# Patient Record
Sex: MALE | Race: WHITE | NOT HISPANIC OR LATINO | URBAN - NONMETROPOLITAN AREA
[De-identification: names, ages, dates, MRNs, and addresses within clinical notes are randomized per-mention and may not be internally consistent; named-entity substitution may affect disease eponyms.]

---

## 2019-03-11 ENCOUNTER — IMPORTED ENCOUNTER (OUTPATIENT)
Dept: URBAN - NONMETROPOLITAN AREA CLINIC 1 | Facility: CLINIC | Age: 64
End: 2019-03-11

## 2019-03-11 PROBLEM — H52.4: Noted: 2019-03-11

## 2019-03-11 PROBLEM — Z79.02: Noted: 2019-03-11

## 2019-03-11 PROCEDURE — 92012 INTRM OPH EXAM EST PATIENT: CPT

## 2019-03-11 PROCEDURE — 92015 DETERMINE REFRACTIVE STATE: CPT

## 2019-03-11 NOTE — PATIENT DISCUSSION
ENRIQUE OU- Discussed diagnosis in detail with patient- Discussed signs and symptoms of progression- Recommend patient drinking plenty of water and starting Omega 3’s - Recommend Refresh or Systane  throughout the day- Consider Restasis or plugs in the future if no improvement- Continue to Heena OU- Discussed diagnosis in detail with patient- Discussed signs and symptoms of progression- Discussed UV protection- No treatment needed at this time - Continue to monitorHyperopia / Presbyopia OU - Discussed diagnosis in detail with patient - New glasses Rx given today- Continue to monitorLong term medication(Plavix)- Discussed diagnosis in detail with patient- No ocular indications concerning problems found today- Monitor

## 2019-04-08 ENCOUNTER — IMPORTED ENCOUNTER (OUTPATIENT)
Dept: URBAN - NONMETROPOLITAN AREA CLINIC 1 | Facility: CLINIC | Age: 64
End: 2019-04-08

## 2019-04-08 PROBLEM — H16.223: Noted: 2019-04-08

## 2019-04-08 PROBLEM — Z79.02: Noted: 2019-03-11

## 2019-04-08 PROBLEM — H25.13: Noted: 2019-04-08

## 2019-04-08 PROBLEM — H52.4: Noted: 2019-04-08

## 2019-04-08 NOTE — PATIENT DISCUSSION
Hyperopia / Presbyopia OU - Discussed diagnosis in detail with patient - MR merida today by me and new Rx was given will have opitcal remake glasses for patient - Continue to monitor----------------------Notes below are from last visit-------------------------ENRIQUE OU- Discussed diagnosis in detail with patient- Discussed signs and symptoms of progression- Recommend patient drinking plenty of water and starting Omega 3’s - Recommend Refresh or Systane  throughout the day- Consider Restasis or plugs in the future if no improvement- Continue to Heena OU- Discussed diagnosis in detail with patient- Discussed signs and symptoms of progression- Discussed UV protection- No treatment needed at this time - Continue to monitorLong term medication(Plavix)- Discussed diagnosis in detail with patient- No ocular indications concerning problems found today- Monitor

## 2019-11-18 ENCOUNTER — IMPORTED ENCOUNTER (OUTPATIENT)
Dept: URBAN - NONMETROPOLITAN AREA CLINIC 1 | Facility: CLINIC | Age: 64
End: 2019-11-18

## 2019-11-18 PROBLEM — H25.13: Noted: 2019-11-18

## 2019-11-18 PROBLEM — Z79.02: Noted: 2019-11-18

## 2019-11-18 PROBLEM — H16.223: Noted: 2019-11-18

## 2019-11-18 PROBLEM — H52.4: Noted: 2019-11-18

## 2019-11-18 PROBLEM — H00.024: Noted: 2019-11-18

## 2019-11-18 PROCEDURE — 99213 OFFICE O/P EST LOW 20 MIN: CPT

## 2019-11-18 NOTE — PATIENT DISCUSSION
Karen FONTANA- Discussed diagnosis in detail with patient - Recommend HOT compresses 10-15 mins on and 45 mins off- Start clindamycin TID x 7 days Rx sent  to [address] [facility] baby shampoo to scrub lids daily - Consider lid consult with Dr. Catia Ricardo if no improvement- Continue to monitor --------------------------------previous notes-----------------------Hyperopia / Presbyopia OU - Discussed diagnosis in detail with patient - MR merida today by me and new Rx was given will have opitcal remake glasses for patient - Continue to monitorDES OU- Discussed diagnosis in detail with patient- Discussed signs and symptoms of progression- Recommend patient drinking plenty of water and starting Omega 3’s - Recommend Refresh or Systane  throughout the day- Consider Restasis or plugs in the future if no improvement- Continue to Heena SCHROEDER- Discussed diagnosis in detail with patient- Discussed signs and symptoms of progression- Discussed UV protection- No treatment needed at this time - Continue to monitorLong term medication(Plavix)- Discussed diagnosis in detail with patient- No ocular indications concerning problems found today- Monitor

## 2019-12-09 ENCOUNTER — IMPORTED ENCOUNTER (OUTPATIENT)
Dept: URBAN - NONMETROPOLITAN AREA CLINIC 1 | Facility: CLINIC | Age: 64
End: 2019-12-09

## 2019-12-09 PROBLEM — H00.14: Noted: 2019-12-09

## 2019-12-09 PROBLEM — H00.024: Noted: 2019-12-09

## 2019-12-09 PROBLEM — Z79.02: Noted: 2019-12-09

## 2019-12-09 PROBLEM — H16.223: Noted: 2019-12-09

## 2019-12-09 PROBLEM — H25.13: Noted: 2019-12-09

## 2019-12-09 PROBLEM — H52.4: Noted: 2019-12-09

## 2019-12-09 PROCEDURE — 99213 OFFICE O/P EST LOW 20 MIN: CPT

## 2019-12-09 NOTE — PATIENT DISCUSSION
Karen FONTANA / Chaloseas- Discussed diagnosis in detail with patient - Recommend HOT compresses 10-15 mins on and 45 mins off- D/C clindamycin- Recommend [employer] baby shampoo to scrub lids daily - Recommend  consult with Dr. Rosangela Arellano patient agrees with plan- Continue to monitor --------------------------------previous notes-----------------------Hyperopia / Presbyopia OU - Discussed diagnosis in detail with patient - MR merida today by me and new Rx was given will have opitcal remake glasses for patient - Continue to monitorDES OU- Discussed diagnosis in detail with patient- Discussed signs and symptoms of progression- Recommend patient drinking plenty of water and starting Omega 3’s - Recommend Refresh or Systane  throughout the day- Consider Restasis or plugs in the future if no improvement- Continue to Heena SCHROEDER- Discussed diagnosis in detail with patient- Discussed signs and symptoms of progression- Discussed UV protection- No treatment needed at this time - Continue to monitorLong term medication(Plavix)- Discussed diagnosis in detail with patient- No ocular indications concerning problems found today- Monitor

## 2020-02-12 ENCOUNTER — IMPORTED ENCOUNTER (OUTPATIENT)
Dept: URBAN - NONMETROPOLITAN AREA CLINIC 1 | Facility: CLINIC | Age: 65
End: 2020-02-12

## 2020-02-12 PROCEDURE — 92012 INTRM OPH EXAM EST PATIENT: CPT

## 2020-02-12 NOTE — PATIENT DISCUSSION
"Harvey VALVERDEL - Discussed diagnosis in detail with patient - Discussed with patient that at this point I recc surgical removal and patient agrees. -All RBA""s of procedure were gone over in detail with patient. Celena Valverde to call patient to schedule [phone].  Task sent. ------------------------------previous notes-----------------------Hyperopia / Presbyopia OU - Discussed diagnosis in detail with patient -  redone today by me and new Rx was given will have opitcal remake glasses for patient - Continue to monitorDES OU- Discussed diagnosis in detail with patient- Discussed signs and symptoms of progression- Recommend patient drinking plenty of water and starting Omega 3’s - Recommend Refresh or Systane  throughout the day- Consider Restasis or plugs in the future if no improvement- Continue to Heena SCHROEDER- Discussed diagnosis in detail with patient- Discussed signs and symptoms of progression- Discussed UV protection- No treatment needed at this time - Continue to monitorLong term medication(Plavix)- Discussed diagnosis in detail with patient- No ocular indications concerning problems found today- Monitor"

## 2020-02-14 PROBLEM — H25.13: Noted: 2020-02-14

## 2020-02-14 PROBLEM — H16.223: Noted: 2020-02-14

## 2020-02-14 PROBLEM — H52.4: Noted: 2020-02-14

## 2020-02-14 PROBLEM — H00.14: Noted: 2020-02-14

## 2020-02-14 PROBLEM — Z79.02: Noted: 2020-02-14

## 2020-03-08 ENCOUNTER — IMPORTED ENCOUNTER (OUTPATIENT)
Dept: URBAN - NONMETROPOLITAN AREA CLINIC 1 | Facility: CLINIC | Age: 65
End: 2020-03-08

## 2020-03-08 PROCEDURE — 67800 REMOVE EYELID LESION: CPT

## 2020-03-08 NOTE — PATIENT DISCUSSION
Chalazion ADDI excision done today w/o any complications after all ABN and consents signedPatient to return to office in one week for a f/uerythromycin ointment sent for patient to use BID x 1 week.  Patient schedule thursday for a complete eye exam. told patient to keep apt time and use this as his f/u and then come back two weeks after that for his complete eye exam. ------------------------------previous notes-----------------------Hyperopia / Presbyopia OU - Discussed diagnosis in detail with patient - MR merida today by me and new Rx was given will have opitcal remake glasses for patient - Continue to monitorDES OU- Discussed diagnosis in detail with patient- Discussed signs and symptoms of progression- Recommend patient drinking plenty of water and starting Omega 3’s - Recommend Refresh or Systane  throughout the day- Consider Restasis or plugs in the future if no improvement- Continue to Heena OU- Discussed diagnosis in detail with patient- Discussed signs and symptoms of progression- Discussed UV protection- No treatment needed at this time - Continue to monitorLong term medication(Plavix)- Discussed diagnosis in detail with patient- No ocular indications concerning problems found today- Monitor

## 2020-03-08 NOTE — PATIENT DISCUSSION
"Harvey VALVERDEL - Discussed diagnosis in detail with patient - Discussed with patient that at this point I recc surgical removal and patient agrees. -All RBA""s of procedure were gone over in detail with patient. Arpita Ruiz to call patient to schedule [phone].  Task sent. ------------------------------previous notes-----------------------Hyperopia / Presbyopia OU - Discussed diagnosis in detail with patient - MR merida today by me and new Rx was given will have opitcal remake glasses for patient - Continue to monitorDES OU- Discussed diagnosis in detail with patient- Discussed signs and symptoms of progression- Recommend patient drinking plenty of water and starting Omega 3’s - Recommend Refresh or Systane  throughout the day- Consider Restasis or plugs in the future if no improvement- Continue to Heena SCHROEDER- Discussed diagnosis in detail with patient- Discussed signs and symptoms of progression- Discussed UV protection- No treatment needed at this time - Continue to monitorLong term medication(Plavix)- Discussed diagnosis in detail with patient- No ocular indications concerning problems found today- Monitor"

## 2020-03-12 ENCOUNTER — IMPORTED ENCOUNTER (OUTPATIENT)
Dept: URBAN - NONMETROPOLITAN AREA CLINIC 1 | Facility: CLINIC | Age: 65
End: 2020-03-12

## 2020-03-12 PROBLEM — Z98.890: Noted: 2020-03-12

## 2020-03-12 PROBLEM — Z79.02: Noted: 2020-02-14

## 2020-03-12 PROBLEM — H52.4: Noted: 2020-02-14

## 2020-03-12 PROBLEM — H16.223: Noted: 2020-02-14

## 2020-03-12 PROBLEM — H25.13: Noted: 2020-02-14

## 2020-03-12 PROCEDURE — 99024 POSTOP FOLLOW-UP VISIT: CPT

## 2020-03-12 NOTE — PATIENT DISCUSSION
4 Day POV Chalazion Removal ADDI- Discussed diagnosis in detail with patient - Patient stable and doing well - May D/C ointment - Continue to monitor - RTC A/S ------------------------------previous notes-----------------------Hyperopia / Presbyopia OU - Discussed diagnosis in detail with patient -  redone today by me and new Rx was given will have opitcal remake glasses for patient - Continue to monitorDES OU- Discussed diagnosis in detail with patient- Discussed signs and symptoms of progression- Recommend patient drinking plenty of water and starting Omega 3’s - Recommend Refresh or Systane  throughout the day- Consider Restasis or plugs in the future if no improvement- Continue to Heena OU- Discussed diagnosis in detail with patient- Discussed signs and symptoms of progression- Discussed UV protection- No treatment needed at this time - Continue to monitorLong term medication(Plavix)- Discussed diagnosis in detail with patient- No ocular indications concerning problems found today- Monitor

## 2020-05-13 ENCOUNTER — IMPORTED ENCOUNTER (OUTPATIENT)
Dept: URBAN - NONMETROPOLITAN AREA CLINIC 1 | Facility: CLINIC | Age: 65
End: 2020-05-13

## 2020-05-13 PROBLEM — R51: Noted: 2020-11-20

## 2020-05-13 PROBLEM — Z79.02: Noted: 2020-05-13

## 2020-05-13 PROBLEM — H16.223: Noted: 2020-05-13

## 2020-05-13 PROBLEM — H25.13: Noted: 2020-05-13

## 2020-05-13 PROBLEM — H53.8: Noted: 2020-11-20

## 2020-05-13 PROBLEM — Z98.890: Noted: 2020-05-13

## 2020-05-13 PROBLEM — R51: Noted: 2020-05-13

## 2020-05-13 PROBLEM — H52.4: Noted: 2020-05-13

## 2020-05-13 PROCEDURE — 99213 OFFICE O/P EST LOW 20 MIN: CPT

## 2020-05-13 NOTE — PATIENT DISCUSSION
Intermittent headaches with fluctuating vision - Discussed diagnosis in detail with patient - VA 20/20 today and pressures are OD 10 and OS 11- Patient was put on Prednisone 20mg BID by GP - GP wanted patient to have ophthalmic exam because of complaints of headaches. GP ordered blood work done results were: SED rate 53 and CRP was 19.6.  Pending results of ultrasound for temporal artery- (-) Signs of Ischemic Optic neuropathy secondary to GCA (giant cell arteritis)- Optic nerve and vision appear normal today- Will communicate with GP and follow up with patient in 1 month - Continue to monitor - RTC 1 month F/U with VF 24-2 and OCT ------------------------------previous notes-----------------------S/P  Chalazion Removal ADDI- Discussed diagnosis in detail with patient - Patient stable and doing well - May D/C ointment - Continue to monitor - RTC A/S Hyperopia / Presbyopia OU - Discussed diagnosis in detail with patient - MR merida today by me and new Rx was given will have optical remake glasses for patient - Continue to monitorDES OU- Discussed diagnosis in detail with patient- Discussed signs and symptoms of progression- Recommend patient drinking plenty of water and starting Omega 3’s - Recommend Refresh or Systane  throughout the day- Consider Restasis or plugs in the future if no improvement- Continue to [place] OU- Discussed diagnosis in detail with patient- Discussed signs and symptoms of progression- Discussed UV protection- No treatment needed at this time - Continue to monitorLong term medication(Plavix)- Discussed diagnosis in detail with patient- No ocular indications concerning problems found today- Monitor

## 2020-07-21 ENCOUNTER — IMPORTED ENCOUNTER (OUTPATIENT)
Dept: URBAN - NONMETROPOLITAN AREA CLINIC 1 | Facility: CLINIC | Age: 65
End: 2020-07-21

## 2020-07-21 PROBLEM — H16.223: Noted: 2020-05-13

## 2020-07-21 PROBLEM — H25.13: Noted: 2020-05-13

## 2020-07-21 PROBLEM — Z79.02: Noted: 2020-05-13

## 2020-07-21 PROBLEM — H52.4: Noted: 2020-05-13

## 2020-07-21 PROBLEM — R51: Noted: 2020-05-13

## 2020-07-21 PROBLEM — H53.8: Noted: 2020-07-21

## 2020-07-21 PROCEDURE — 92015 DETERMINE REFRACTIVE STATE: CPT

## 2020-07-21 PROCEDURE — 92014 COMPRE OPH EXAM EST PT 1/>: CPT

## 2020-07-21 PROCEDURE — 92083 EXTENDED VISUAL FIELD XM: CPT

## 2020-07-21 PROCEDURE — 92133 CPTRZD OPH DX IMG PST SGM ON: CPT

## 2020-07-21 NOTE — PATIENT DISCUSSION
Intermittent headaches with fluctuating vision - Discussed diagnosis in detail with patient - VA 20/22 OD and 20/20 OS today and pressures are OD 12 and OS 11 stable from last visit- Patient was put on Prednisone 20mg BID by GP - GP wanted patient to have ophthalmic exam because of complaints of headaches. GP ordered blood work done results were: SED rate 53 and CRP was 19.6. Pending results of ultrasound for temporal artery. Will need to discuss further testing results with Dr. Lani Regalado. - No signs of Ischemic Optic neuropathy secondary to GCA (giant cell arteritis)- Optic nerve and vision appear normal today- Baseline VF done today good and reliable without abnormalities seen.  - Baseline OCT done today good quality OD without any thinning noted and OS was poor quality but feel that he has no thinning based on posterior exam. - Will communicate with GP and follow up - Continue to monitor for changes PRNHyperopia / Presbyopia OU - Discussed diagnosis in detail with patient - MR done by me today new GLRx given and recommended update- Continue to monitor PRNDES OU- Discussed diagnosis in detail with patient- Discussed signs and symptoms of progression- Recommend patient drinking plenty of water and starting Omega 3’s - Recommend Refresh or Systane  throughout the day- Consider Restasis or plugs in the future if no improvement- Continue to monitorCataracts OU- Discussed diagnosis in detail with patient- Discussed signs and symptoms of progression including blurry vision- Discussed UV protection- No treatment needed at this time - Continue to monitorLong term medication(Plavix)- Discussed diagnosis in detail with patient- No ocular indications concerning problems found today- MonitorHx of Chalazion Removal ADDI- Discussed diagnosis in detail with patient - Patient stable and doing well - Continue to monitor recurrence PRN; Dr's Notes: MR  7/21/20DFE  OCT disc 7/21/20VF 24-2  7/21/20

## 2020-09-03 ENCOUNTER — IMPORTED ENCOUNTER (OUTPATIENT)
Dept: URBAN - NONMETROPOLITAN AREA CLINIC 1 | Facility: CLINIC | Age: 65
End: 2020-09-03

## 2020-09-03 NOTE — PATIENT DISCUSSION
RX check- Discusseddiagnosis in detail with patient - MR done today by Dr. Chad Mast will remake lenses- Patient is still on steroids and probably will be for a year - Continue to monitor - RTC A/S --------------------------------previous notes---------------------------Intermittent headaches with fluctuating vision - Discussed diagnosis in detail with patient - VA 20/22 OD and 20/20 OS today and pressures are OD 12 and OS 11 stable from last visit- Patient was put on Prednisone 20mg BID by GP - GP wanted patient to have ophthalmic exam because of complaints of headaches. GP ordered blood work done results were: SED rate 53 and CRP was 19.6. Pending results of ultrasound for temporal artery. Will need to discuss further testing results with Dr. Anette Bradley. - No signs of Ischemic Optic neuropathy secondary to GCA (giant cell arteritis)- Optic nerve and vision appear normal today- Baseline VF done today good and reliable without abnormalities seen.  - Baseline OCT done today good quality OD without any thinning noted and OS was poor quality but feel that he has no thinning based on posterior exam. - Will communicate with GP and follow up - Continue to monitor for changes PRNHyperopia / Presbyopia OU - Discussed diagnosis in detail with patient - MR done by me today new GLRx given and recommended update- Continue to monitor PRNDES OU- Discussed diagnosis in detail with patient- Discussed signs and symptoms of progression- Recommend patient drinking plenty of water and starting Omega 3’s - Recommend Refresh or Systane  throughout the day- Consider Restasis or plugs in the future if no improvement- Continue to monitorCataracts OU- Discussed diagnosis in detail with patient- Discussed signs and symptoms of progression including blurry vision- Discussed UV protection- No treatment needed at this time - Continue to monitorLong term medication(Plavix)- Discussed diagnosis in detail with patient- No ocular indications concerning problems found today- MonitorHx of Chalazion Removal ADDI- Discussed diagnosis in detail with patient - Patient stable and doing well - Continue to monitor recurrence PRN; Dr's Notes: MR  7/21/20DFE  OCT disc 7/21/20VF 24-2  7/21/20

## 2020-11-20 ENCOUNTER — IMPORTED ENCOUNTER (OUTPATIENT)
Dept: URBAN - NONMETROPOLITAN AREA CLINIC 1 | Facility: CLINIC | Age: 65
End: 2020-11-20

## 2020-11-20 PROCEDURE — 99212 OFFICE O/P EST SF 10 MIN: CPT

## 2020-11-20 NOTE — PATIENT DISCUSSION
Blurred VA - Discusseddiagnosis in detail with patient - MR done today by Dr. Charlie Vela- patient got to 20/20. RX today from previous one done is much higher. Discussed with patient. - Patient is clear on todays slit lamp exam . -Discussed blood work he has had in the past and he is currently on Prednisone and discussed how that affects his [place] and will make his vision fluctuate. Discussed his VA will continue to fluctuate while on this and if he has his lens made he may still be unhappy and have blurred VA with the new updated RX even though the RX is helping him and getting him to 20/20 today. Patient expressed understanding. Do not recommend update gls. Recommend updated MR after he is off the steroids. -Patient will see GP in Jan and then see me after. -Continue to monitor --------------------------------previous notes---------------------------Intermittent headaches with fluctuating vision - Discussed diagnosis in detail with patient - VA 20/22 OD and 20/20 OS today and pressures are OD 12 and OS 11 stable from last visit- Patient was put on Prednisone 20mg BID by GP - GP wanted patient to have ophthalmic exam because of complaints of headaches. GP ordered blood work done results were: SED rate 53 and CRP was 19.6. Pending results of ultrasound for temporal artery. Will need to discuss further testing results with Dr. Alejandro Delcid. - No signs of Ischemic Optic neuropathy secondary to GCA (giant cell arteritis)- Optic nerve and vision appear normal today- Baseline VF done today good and reliable without abnormalities seen.  - Baseline OCT done today good quality OD without any thinning noted and OS was poor quality but feel that he has no thinning based on posterior exam. - Will communicate with GP and follow up - Continue to monitor for changes PRNHyperopia / Presbyopia OU - Discussed diagnosis in detail with patient - MR done by me today new GLRx given and recommended update- Continue to monitor PRNDES OU- Discussed diagnosis in detail with patient- Discussed signs and symptoms of progression- Recommend patient drinking plenty of water and starting Omega 3’s - Recommend Refresh or Systane  throughout the day- Consider Restasis or plugs in the future if no improvement- Continue to monitorCataracts OU- Discussed diagnosis in detail with patient- Discussed signs and symptoms of progression including blurry vision- Discussed UV protection- No treatment needed at this time - Continue to monitorLong term medication(Plavix)- Discussed diagnosis in detail with patient- No ocular indications concerning problems found today- MonitorHx of Chalazion Removal ADDI- Discussed diagnosis in detail with patient - Patient stable and doing well - Continue to monitor recurrence PRN; 's Notes: MR  7/21/20DFE  OCT disc 7/21/20VF 24-2  7/21/20

## 2021-01-26 ENCOUNTER — IMPORTED ENCOUNTER (OUTPATIENT)
Dept: URBAN - NONMETROPOLITAN AREA CLINIC 1 | Facility: CLINIC | Age: 66
End: 2021-01-26

## 2021-01-26 PROCEDURE — 99213 OFFICE O/P EST LOW 20 MIN: CPT

## 2021-01-26 NOTE — PATIENT DISCUSSION
Blurred VA - Discusseddiagnosis in detail with patient - MR done previously by Dr. Cale Zendejas- patient got to 20/20. RX today from previous one done is much higher. Discussed with patient. - Patient is clear on todays slit lamp exam . - Discussed blood work he has had in the past and he is currently on Prednisone and discussed how that affects his [place] and will make his vision fluctuate. Discussed his VA will continue to fluctuate while on this and if he has his lens made he may still be unhappy and have blurred VA with the new updated RX even though the RX is helping him and getting him to 20/20 . Patient expressed understanding. Do not recommend update gls. Recommend updated MR after he is off the steroids. - Continue to monitor --------------------------------previous notes---------------------------Intermittent headaches with fluctuating vision - Discussed diagnosis in detail with patient - VA 20/22 OD and 20/20 OS today and pressures are OD 12 and OS 11 stable from last visit- Patient was put on Prednisone 20mg BID by GP - GP wanted patient to have ophthalmic exam because of complaints of headaches. GP ordered blood work done results were: SED rate 53 and CRP was 19.6. Pending results of ultrasound for temporal artery. Will need to discuss further testing results with Dr. Christin Mauro. - No signs of Ischemic Optic neuropathy secondary to GCA (giant cell arteritis)- Optic nerve and vision appear normal today- Baseline VF done today good and reliable without abnormalities seen.  - Baseline OCT done today good quality OD without any thinning noted and OS was poor quality but feel that he has no thinning based on posterior exam. - Will communicate with GP and follow up - Continue to monitor for changes PRNHyperopia / Presbyopia OU - Discussed diagnosis in detail with patient - MR done by me today new GLRx given and recommended update- Continue to monitor PRNDES OU- Discussed diagnosis in detail with patient- Discussed signs and symptoms of progression- Recommend patient drinking plenty of water and starting Omega 3’s - Recommend Refresh or Systane  throughout the day- Consider Restasis or plugs in the future if no improvement- Continue to monitorCataracts OU- Discussed diagnosis in detail with patient- Discussed signs and symptoms of progression including blurry vision- Discussed UV protection- No treatment needed at this time - Continue to monitorLong term medication(Plavix)- Discussed diagnosis in detail with patient- No ocular indications concerning problems found today- MonitorHx of Chalazion Removal ADDI- Discussed diagnosis in detail with patient - Patient stable and doing well - Continue to monitor recurrence PRN; 's Notes: MR  7/21/20DFE  OCT disc 7/21/20VF 24-2  7/21/20

## 2021-05-04 ENCOUNTER — IMPORTED ENCOUNTER (OUTPATIENT)
Dept: URBAN - NONMETROPOLITAN AREA CLINIC 1 | Facility: CLINIC | Age: 66
End: 2021-05-04

## 2021-05-04 PROCEDURE — 92250 FUNDUS PHOTOGRAPHY W/I&R: CPT

## 2021-05-04 PROCEDURE — 99213 OFFICE O/P EST LOW 20 MIN: CPT

## 2021-05-04 NOTE — PATIENT DISCUSSION
Blurred VA - Discussed diagnosis in detail with patient - MR done previously by Dr. Mega Colon- Patient is clear on todays slit lamp exam . - Patient is still currently on Prednisone and discussed how that affects his VA and will make his vision fluctuate. Discussed his VA will continue to fluctuate while on this and if he has his lens made he may still be unhappy and have blurred VA with the new updated RX even though the RX is helping him and getting him to 20/20 . Patient expressed understanding. Do not recommend update gls. Recommend updated MR after he is off the steroids. - Optos done today stable OU - Continue to monitor --------------------------------previous notes---------------------------Intermittent headaches with fluctuating vision - Discussed diagnosis in detail with patient - VA 20/22 OD and 20/20 OS today and pressures are OD 12 and OS 11 stable from last visit- Patient was put on Prednisone 20mg BID by GP - GP wanted patient to have ophthalmic exam because of complaints of headaches. GP ordered blood work done results were: SED rate 53 and CRP was 19.6. Pending results of ultrasound for temporal artery. Will need to discuss further testing results with Dr. Carlee Porter. - No signs of Ischemic Optic neuropathy secondary to GCA (giant cell arteritis)- Optic nerve and vision appear normal today- Baseline VF done today good and reliable without abnormalities seen.  - Baseline OCT done today good quality OD without any thinning noted and OS was poor quality but feel that he has no thinning based on posterior exam. - Will communicate with GP and follow up - Continue to monitor for changes PRNHyperopia / Presbyopia OU - Discussed diagnosis in detail with patient - MR done by me today new GLRx given and recommended update- Continue to monitor PRNDES OU- Discussed diagnosis in detail with patient- Discussed signs and symptoms of progression- Recommend patient drinking plenty of water and starting Omega 3’s - Recommend Refresh or Systane  throughout the day- Consider Restasis or plugs in the future if no improvement- Continue to monitorCataracts OU- Discussed diagnosis in detail with patient- Discussed signs and symptoms of progression including blurry vision- Discussed UV protection- No treatment needed at this time - Continue to monitorLong term medication(Plavix)- Discussed diagnosis in detail with patient- No ocular indications concerning problems found today- MonitorHx of Chalazion Removal ADDI- Discussed diagnosis in detail with patient - Patient stable and doing well - Continue to monitor recurrence PRN; Dr's Notes: MR  7/21/20DFE  OCT disc 7/21/20VF 24-2  7/21/20

## 2021-07-16 ENCOUNTER — IMPORTED ENCOUNTER (OUTPATIENT)
Dept: URBAN - NONMETROPOLITAN AREA CLINIC 1 | Facility: CLINIC | Age: 66
End: 2021-07-16

## 2021-07-16 ENCOUNTER — PREPPED CHART (OUTPATIENT)
Dept: URBAN - NONMETROPOLITAN AREA CLINIC 1 | Facility: CLINIC | Age: 66
End: 2021-07-16

## 2021-07-16 PROBLEM — H53.8: Noted: 2021-07-16

## 2021-07-16 PROBLEM — H52.4: Noted: 2021-07-16

## 2021-07-16 PROBLEM — H25.813: Noted: 2021-07-16

## 2021-07-16 PROBLEM — Z79.02: Noted: 2021-07-16

## 2021-07-16 PROBLEM — R51.9: Noted: 2021-07-16

## 2021-07-16 PROBLEM — H16.223: Noted: 2021-07-16

## 2021-07-16 PROCEDURE — 92015 DETERMINE REFRACTIVE STATE: CPT

## 2021-07-16 PROCEDURE — 99213 OFFICE O/P EST LOW 20 MIN: CPT

## 2021-07-16 NOTE — PATIENT DISCUSSION
Blurred VA improved / Refraction - Discussed diagnosis in detail with patient - MR done today by Dr. Cale Zendejas and new glasses RX given explained to patient that his RX has changed a lot. - Blurred vision improved with new RX - Patient finished Prednisone in June - Optos done today stable OU - Continue to monitor --------------------------------previous notes---------------------------Intermittent headaches with fluctuating vision - Discussed diagnosis in detail with patient - VA 20/22 OD and 20/20 OS today and pressures are OD 12 and OS 11 stable from last visit- Patient was put on Prednisone 20mg BID by GP - GP wanted patient to have ophthalmic exam because of complaints of headaches. GP ordered blood work done results were: SED rate 53 and CRP was 19.6. Pending results of ultrasound for temporal artery. Will need to discuss further testing results with Dr. Christin Mauro. - No signs of Ischemic Optic neuropathy secondary to GCA (giant cell arteritis)- Optic nerve and vision appear normal today- Baseline VF done today good and reliable without abnormalities seen.  - Baseline OCT done today good quality OD without any thinning noted and OS was poor quality but feel that he has no thinning based on posterior exam. - Will communicate with GP and follow up - Continue to monitor for changes PRNHyperopia / Presbyopia OU - Discussed diagnosis in detail with patient - MR done by me today new GLRx given and recommended update- Continue to monitor PRNDES OU- Discussed diagnosis in detail with patient- Discussed signs and symptoms of progression- Recommend patient drinking plenty of water and starting Omega 3’s - Recommend Refresh or Systane  throughout the day- Consider Restasis or plugs in the future if no improvement- Continue to monitorCataracts OU- Discussed diagnosis in detail with patient- Discussed signs and symptoms of progression including blurry vision- Discussed UV protection- No treatment needed at this time - Continue to monitorLong term medication(Plavix)- Discussed diagnosis in detail with patient- No ocular indications concerning problems found today- MonitorHx of Chalazion Removal ADDI- Discussed diagnosis in detail with patient - Patient stable and doing well - Continue to monitor recurrence PRN; Dr's Notes: MR  7/21/20DFE  OCT disc 7/21/20VF 24-2  7/21/20Optos 7/16/21

## 2021-07-16 NOTE — PATIENT DISCUSSION
Discussed diagnosis in detail with patient today. GP ordered blood work; SED rate 53 and CRP was 19.6. Pending results of ultrasound for temporal artery. Will need to discuss further testing results with Dr. Jono Mir. No signs of Ischemic Optic Neuropathy secondary to GCA (giant cell arteritis). Optic nerve and vision appear normal today. Will communicate with GP and follow up. Continue to monitor for changes.

## 2021-07-16 NOTE — PATIENT DISCUSSION
Discussed diagnosis in detail with patient. No ocular indication concerning problems found today. Monitor.

## 2021-07-16 NOTE — PATIENT DISCUSSION
Discussed diagnosis in detail with patient. Discussed signs and symptoms of progression. Recommend patient drinking plenty of water and starting Omega 3s. Recommend Refresh or Systane throughout the day. Consider Restasis or plugs in the future if no improvement. Continue to monitor.

## 2021-07-16 NOTE — PATIENT DISCUSSION
Discussed diagnosis in detail with patient. Discussed signs and symptoms of progression, including blurry vision. Discussed UV protection. No treatment needed at this time. Continue to monitor.

## 2022-01-26 ASSESSMENT — VISUAL ACUITY
OD_CC: 20/40
OS_CC: 20/25-2

## 2022-01-26 ASSESSMENT — TONOMETRY
OS_IOP_MMHG: 16
OD_IOP_MMHG: 16

## 2022-04-09 ASSESSMENT — TONOMETRY
OS_IOP_MMHG: 11
OS_IOP_MMHG: 13
OD_IOP_MMHG: 13
OD_IOP_MMHG: 12
OD_IOP_MMHG: 12
OS_IOP_MMHG: 14
OD_IOP_MMHG: 11
OS_IOP_MMHG: 16
OS_IOP_MMHG: 11
OD_IOP_MMHG: 14
OS_IOP_MMHG: 12
OD_IOP_MMHG: 10
OD_IOP_MMHG: 12
OS_IOP_MMHG: 11
OS_IOP_MMHG: 14
OD_IOP_MMHG: 14
OD_IOP_MMHG: 13
OS_IOP_MMHG: 14
OD_IOP_MMHG: 16
OS_IOP_MMHG: 14

## 2022-04-09 ASSESSMENT — VISUAL ACUITY
OD_SC: 20/25-1
OS_SC: 20/25- BLURRY
OS_SC: 20/20
OS_SC: 20/20
OS_SC: 20/30
OS_SC: 20/20
OD_SC: 20/20
OD_SC: 20/20
OS_SC: 20/20
OS_SC: 20/63
OS_SC: 20/20-
OD_SC: 20/20-
OD_SC: 20/20
OS_SC: 20/25-
OU_CC: 20/30
OS_SC: 20/20
OD_SC: 20/30-1
OS_SC: 20/25-2
OS_SC: 20/20
OD_SC: 20/25
OD_SC: 20/20
OD_SC: 20/22
OD_SC: 20/40
OS_SC: 20/20
OD_SC: 20/25+
OD_SC: 20/20-
OD_SC: 20/20

## 2022-08-05 ENCOUNTER — ESTABLISHED PATIENT (OUTPATIENT)
Dept: URBAN - NONMETROPOLITAN AREA CLINIC 1 | Facility: CLINIC | Age: 67
End: 2022-08-05

## 2022-08-05 DIAGNOSIS — H25.813: ICD-10-CM

## 2022-08-05 DIAGNOSIS — H16.223: ICD-10-CM

## 2022-08-05 DIAGNOSIS — H52.4: ICD-10-CM

## 2022-08-05 DIAGNOSIS — Z79.899: ICD-10-CM

## 2022-08-05 PROCEDURE — 92014 COMPRE OPH EXAM EST PT 1/>: CPT

## 2022-08-05 PROCEDURE — 92015 DETERMINE REFRACTIVE STATE: CPT

## 2022-08-05 ASSESSMENT — VISUAL ACUITY
OS_PH: 20/30
OD_CC: 20/20
OS_CC: 20/40-1

## 2022-08-05 ASSESSMENT — TONOMETRY
OS_IOP_MMHG: 12
OD_IOP_MMHG: 12

## 2022-08-05 NOTE — PATIENT DISCUSSION
Previous notes: Discussed diagnosis in detail with patient today. GP ordered blood work; SED rate 53 and CRP was 19.6. Pending results of ultrasound for temporal artery. Will need to discuss further testing results with Dr. Km Abel. No signs of Ischemic Optic Neuropathy secondary to GCA (giant cell arteritis). Optic nerve and vision appear normal today. Will communicate with GP and follow up. Continue to monitor for changes.

## 2022-08-05 NOTE — PATIENT DISCUSSION
Discussed diagnosis in detail with patient. MR done today by Dr. Noni Quinn and new glasses Rx given, explained to patient that his Rx has changed a lot. Blurred vision improved with new Rx.

## 2024-01-02 ENCOUNTER — EMERGENCY VISIT (OUTPATIENT)
Dept: URBAN - NONMETROPOLITAN AREA CLINIC 1 | Facility: CLINIC | Age: 69
End: 2024-01-02

## 2024-01-02 DIAGNOSIS — H25.813: ICD-10-CM

## 2024-01-02 PROCEDURE — 99213 OFFICE O/P EST LOW 20 MIN: CPT

## 2024-01-02 ASSESSMENT — TONOMETRY
OS_IOP_MMHG: 11
OD_IOP_MMHG: 12

## 2024-01-02 ASSESSMENT — VISUAL ACUITY
OD_CC: 20/40
OS_CC: 20/30

## 2024-05-02 ENCOUNTER — COMPREHENSIVE EXAM (OUTPATIENT)
Dept: URBAN - NONMETROPOLITAN AREA CLINIC 1 | Facility: CLINIC | Age: 69
End: 2024-05-02

## 2024-05-02 DIAGNOSIS — H52.4: ICD-10-CM

## 2024-05-02 PROCEDURE — 92014 COMPRE OPH EXAM EST PT 1/>: CPT

## 2024-05-02 PROCEDURE — 92015 DETERMINE REFRACTIVE STATE: CPT

## 2024-05-02 ASSESSMENT — TONOMETRY
OS_IOP_MMHG: 12
OD_IOP_MMHG: 12

## 2024-05-02 ASSESSMENT — VISUAL ACUITY
OD_CC: 20/60
OD_PH: 20/30
OU_CC: 20/30
OS_CC: 20/30-1

## 2024-05-31 ENCOUNTER — CONTACT LENSES/GLASSES VISIT (OUTPATIENT)
Dept: URBAN - NONMETROPOLITAN AREA CLINIC 1 | Facility: CLINIC | Age: 69
End: 2024-05-31

## 2024-05-31 DIAGNOSIS — H52.4: ICD-10-CM

## 2024-05-31 PROCEDURE — 92015 DETERMINE REFRACTIVE STATE: CPT | Mod: NC

## 2024-05-31 ASSESSMENT — VISUAL ACUITY
OS_PH: 20/30-1
OD_CC: 20/150+2
OU_CC: 20/60
OS_CC: 20/60
OD_PH: 20/70

## 2025-05-05 ENCOUNTER — COMPREHENSIVE EXAM (OUTPATIENT)
Age: 70
End: 2025-05-05

## 2025-05-05 DIAGNOSIS — H52.4: ICD-10-CM

## 2025-05-05 PROCEDURE — 92015 DETERMINE REFRACTIVE STATE: CPT

## 2025-05-05 PROCEDURE — 92014 COMPRE OPH EXAM EST PT 1/>: CPT
